# Patient Record
Sex: FEMALE | Race: WHITE | HISPANIC OR LATINO
[De-identification: names, ages, dates, MRNs, and addresses within clinical notes are randomized per-mention and may not be internally consistent; named-entity substitution may affect disease eponyms.]

---

## 2023-04-18 ENCOUNTER — NON-APPOINTMENT (OUTPATIENT)
Age: 48
End: 2023-04-18

## 2023-04-18 PROBLEM — Z00.00 ENCOUNTER FOR PREVENTIVE HEALTH EXAMINATION: Status: ACTIVE | Noted: 2023-04-18

## 2023-04-19 ENCOUNTER — APPOINTMENT (OUTPATIENT)
Dept: ORTHOPEDIC SURGERY | Facility: CLINIC | Age: 48
End: 2023-04-19
Payer: MEDICAID

## 2023-04-19 ENCOUNTER — NON-APPOINTMENT (OUTPATIENT)
Age: 48
End: 2023-04-19

## 2023-04-19 PROCEDURE — 99204 OFFICE O/P NEW MOD 45 MIN: CPT

## 2023-04-19 RX ORDER — DICLOFENAC SODIUM 50 MG/1
50 TABLET, DELAYED RELEASE ORAL
Qty: 60 | Refills: 0 | Status: ACTIVE | COMMUNITY
Start: 2023-04-19 | End: 1900-01-01

## 2023-04-19 NOTE — DISCUSSION/SUMMARY
[de-identified] : Discussed findings of today's exam and possible causes of patient's pain.  Educated patient on their most probable diagnosis of acute right knee pain due to MCL sprain, likely grade 1.  Reviewed possible courses of treatment, and we collaboratively decided best course of treatment at this time will include conservative management.  Patient has no effusion or instability, no evidence of acute internal derangement or high-grade ligament tear/rupture, no surgical indications, no need for MRI at this time.  Patient started on a course of oral NSAIDs, prescription given for diclofenac (We discussed all possible side effects of this medication).  Patient will be started on a course of physical therapy to restore normal range of motion and strength as tolerated.  Patient given a hinged knee compression brace to be worn during the day for support.  Patient is advised she can gradually discontinue utilizing crutches as soon as tolerated.  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  Recommend she be out of work for the next 2 weeks, she will follow-up at that time for reassessment.  Patient appreciates and agrees with current plan.\par \par A  was utilized to communicate the patient's primary language.\par \par This note was generated using dragon medical dictation software.  A reasonable effort has been made for proofreading its contents, but typos may still remain.  If there are any questions or points of clarification needed please notify my office.

## 2023-04-19 NOTE — CONSULT LETTER
[Dear  ___] : Dear ~JOHANA, [Consult Letter:] : I had the pleasure of evaluating your patient, [unfilled]. [Please see my note below.] : Please see my note below. [Sincerely,] : Sincerely, [FreeTextEntry2] : PCP\par Renetta Brandon [FreeTextEntry3] : Earnest Bryant DO, ATC\par Primary Care Sports Medicine\par [facility] [place]

## 2023-04-19 NOTE — PHYSICAL EXAM
[de-identified] : Constitutional: Well-nourished, well-developed, No acute distress\par Respiratory:  Good respiratory effort, no SOB\par Lymphatic: No regional lymphadenopathy, no lymphedema\par Psychiatric: Pleasant and normal affect, alert and oriented x3\par Musculoskeletal: normal except where as noted in regional exam\par \par Right knee:\par APPEARANCE: No swelling, no marked deformities or malalignment\par POSITIVE TENDERNESS: Moderate tenderness at the MCL, most notably at the midpoint and tibial insertion, + medial joint line tenderness\par NONTENDER: lateral jt line & retinacula, patellar & quadriceps tendons, LCL, ITB at the lateral femoral condyle & Gerdy's tubercle, pes bursa. \par ROM: full ext, with mild medial knee pain in mild restriction in deep flexion. \par RESISTIVE TESTING: painless resisted knee flex/ext. \par SPECIAL TESTS: + Valgus stress test with [default value], stable varus stress.  Florin's and Thessaly recreated medial knee pain, no locking or clicking noted.  painless grind. neg Lachman's. neg ant/post drawer. neg Ruthie's & Malacrae's\par   [de-identified] : I reviewed, interpreted and clinically correlated the following outside imaging studies,\par  [facility]\par X-ray, 3 views of the right knee, evidence of mild patellofemoral compartment osteoarthritis, no acute fracture, no malalignment

## 2023-04-19 NOTE — RETURN TO WORK/SCHOOL
[FreeTextEntry1] : Kianna was seen today for evaluation and management of right knee orthopedic injury.  She is unable to perform her work duties at this time.  Please excuse her from work until reassessed in 2 weeks.  This note expires on 5/4/2023.\par Should you have any questions please call the office at [phone]\par Thank you for your understanding.\par \par Sincerely,\par \par Earnest Bryant DO, ATC\par Primary Care Sports Medicine\par [facility] [place]\par

## 2023-04-19 NOTE — HISTORY OF PRESENT ILLNESS
[de-identified] : Patient is here for right knee pain that began on 4/3/23. She was at work as a  when she was vacuuming and took a step and felt knee pain. She continued to work until 4/7/23. She went to [facility] ER on 4/10/23 where xrays were taken that were negative for fracture. She has not returned to work since. She presents today with crutches. She is taking Ibuprofen. Denies N/T/R/Prior injury. \par \par The patient's past medical history, past surgical history, medications and allergies were reviewed by me today and documented accordingly. In addition, the patient's family and social history, which were noncontributory to this visit, were reviewed also. Intake form was reviewed. The patient has no family history of arthritis.

## 2023-05-03 ENCOUNTER — APPOINTMENT (OUTPATIENT)
Dept: ORTHOPEDIC SURGERY | Facility: CLINIC | Age: 48
End: 2023-05-03
Payer: MEDICAID

## 2023-05-03 DIAGNOSIS — S83.411A SPRAIN OF MEDIAL COLLATERAL LIGAMENT OF RIGHT KNEE, INITIAL ENCOUNTER: ICD-10-CM

## 2023-05-03 PROCEDURE — 99213 OFFICE O/P EST LOW 20 MIN: CPT

## 2023-05-03 NOTE — PHYSICAL EXAM
[de-identified] : Constitutional: Well-nourished, well-developed, No acute distress\par Respiratory:  Good respiratory effort, no SOB\par Lymphatic: No regional lymphadenopathy, no lymphedema\par Psychiatric: Pleasant and normal affect, alert and oriented x3\par Musculoskeletal: normal except where as noted in regional exam\par \par Right knee:\par APPEARANCE: no swelling, no marked deformities or malalignment\par POSITIVE TENDERNESS:  none\par NONTENDER: jt lines b/l & retinacula b/l, patellar & quadriceps tendons, MCL/LCL, ITB at the lateral femoral condyle & Gerdy's tubercle, pes bursa. \par ROM: full & painless. \par RESISTIVE TESTING: painless resisted knee flex/ext. \par SPECIAL TESTS: stable v/v stress. painless grind. neg Lachman's. neg ant/post drawer. neg Florin's. \par

## 2023-05-03 NOTE — RETURN TO WORK/SCHOOL
[FreeTextEntry1] : Kianna was seen today for reevaluation of her right knee pain.  She is cleared for full work duty without restrictions at this time.\par Should you have any questions please call the office at [phone]\par Thank you for your understanding.\par \par Sincerely,\par \par Earnest Bryant DO, ATC\par Primary Care Sports Medicine\par [facility] [place]\par

## 2023-05-03 NOTE — HISTORY OF PRESENT ILLNESS
[de-identified] : Patient is here for right knee pain follow up. Patient has noticed improvement. She feels ready to return to work. There was no recent injury.

## 2023-05-03 NOTE — DISCUSSION/SUMMARY
[de-identified] : Patient was seen today for reevaluation of right knee pain due to grade 1 MCL sprain.  At this time she has full range of motion, full strength, and no tenderness to palpation on clinical exam.  She has no reproduction of pain today.  She has full routine healing.  She is cleared to return to full work duty without restrictions.  No follow-up needed unless she has return of pain.  Patient appreciates and agrees with current plan.\par \par A  was utilized to communicate the patient's primary language.\par \par This note was generated using dragon medical dictation software.  A reasonable effort has been made for proofreading its contents, but typos may still remain.  If there are any questions or points of clarification needed please notify my office.

## 2023-08-15 ENCOUNTER — APPOINTMENT (OUTPATIENT)
Dept: ORTHOPEDIC SURGERY | Facility: CLINIC | Age: 48
End: 2023-08-15
Payer: MEDICAID

## 2023-08-15 VITALS
BODY MASS INDEX: 27.21 KG/M2 | HEART RATE: 102 BPM | SYSTOLIC BLOOD PRESSURE: 153 MMHG | HEIGHT: 59 IN | WEIGHT: 135 LBS | DIASTOLIC BLOOD PRESSURE: 93 MMHG

## 2023-08-15 PROCEDURE — 73610 X-RAY EXAM OF ANKLE: CPT | Mod: RT

## 2023-08-15 PROCEDURE — 99214 OFFICE O/P EST MOD 30 MIN: CPT | Mod: 25

## 2023-09-12 ENCOUNTER — APPOINTMENT (OUTPATIENT)
Dept: ORTHOPEDIC SURGERY | Facility: CLINIC | Age: 48
End: 2023-09-12
Payer: MEDICAID

## 2023-09-12 PROCEDURE — 99213 OFFICE O/P EST LOW 20 MIN: CPT | Mod: 25

## 2023-09-12 PROCEDURE — 73610 X-RAY EXAM OF ANKLE: CPT | Mod: RT

## 2023-10-03 ENCOUNTER — NON-APPOINTMENT (OUTPATIENT)
Age: 48
End: 2023-10-03

## 2023-10-03 ENCOUNTER — APPOINTMENT (OUTPATIENT)
Dept: ORTHOPEDIC SURGERY | Facility: CLINIC | Age: 48
End: 2023-10-03
Payer: MEDICAID

## 2023-10-03 VITALS — WEIGHT: 140 LBS | HEIGHT: 59 IN | BODY MASS INDEX: 28.22 KG/M2

## 2023-10-03 DIAGNOSIS — S82.891D OTHER FRACTURE OF RIGHT LOWER LEG, SUBSEQUENT ENCOUNTER FOR CLOSED FRACTURE WITH ROUTINE HEALING: ICD-10-CM

## 2023-10-03 PROCEDURE — 99213 OFFICE O/P EST LOW 20 MIN: CPT | Mod: 25

## 2023-10-03 PROCEDURE — 73610 X-RAY EXAM OF ANKLE: CPT | Mod: RT

## 2023-11-14 ENCOUNTER — APPOINTMENT (OUTPATIENT)
Dept: ORTHOPEDIC SURGERY | Facility: CLINIC | Age: 48
End: 2023-11-14

## 2023-11-14 ENCOUNTER — NON-APPOINTMENT (OUTPATIENT)
Age: 48
End: 2023-11-14

## 2023-12-14 ENCOUNTER — RESULT REVIEW (OUTPATIENT)
Age: 48
End: 2023-12-14

## 2025-04-12 ENCOUNTER — RESULT REVIEW (OUTPATIENT)
Age: 50
End: 2025-04-12